# Patient Record
Sex: FEMALE | Race: WHITE | HISPANIC OR LATINO | Employment: UNEMPLOYED | URBAN - METROPOLITAN AREA
[De-identification: names, ages, dates, MRNs, and addresses within clinical notes are randomized per-mention and may not be internally consistent; named-entity substitution may affect disease eponyms.]

---
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
## 2022-01-12 VITALS — BODY MASS INDEX: 45.98 KG/M2 | WEIGHT: 276 LBS | HEIGHT: 65 IN

## 2024-09-24 ENCOUNTER — HOSPITAL ENCOUNTER (EMERGENCY)
Facility: CLINIC | Age: 24
Discharge: HOME | End: 2024-09-24
Attending: EMERGENCY MEDICINE | Admitting: EMERGENCY MEDICINE
Payer: COMMERCIAL

## 2024-09-24 ENCOUNTER — ANCILLARY PROCEDURE (OUTPATIENT)
Dept: ULTRASOUND IMAGING | Facility: CLINIC | Age: 24
End: 2024-09-24
Attending: EMERGENCY MEDICINE
Payer: COMMERCIAL

## 2024-09-24 VITALS
WEIGHT: 280 LBS | TEMPERATURE: 98.9 F | OXYGEN SATURATION: 99 % | BODY MASS INDEX: 46.65 KG/M2 | HEIGHT: 65 IN | DIASTOLIC BLOOD PRESSURE: 93 MMHG | RESPIRATION RATE: 22 BRPM | HEART RATE: 97 BPM | SYSTOLIC BLOOD PRESSURE: 141 MMHG

## 2024-09-24 DIAGNOSIS — L03.116 CELLULITIS OF LEFT LEG: ICD-10-CM

## 2024-09-24 PROCEDURE — 99284 EMERGENCY DEPT VISIT MOD MDM: CPT

## 2024-09-24 RX ORDER — CEPHALEXIN 500 MG/1
500 CAPSULE ORAL 3 TIMES DAILY
Qty: 30 CAPSULE | Refills: 0 | Status: SHIPPED | OUTPATIENT
Start: 2024-09-24 | End: 2024-10-04

## 2024-09-24 RX ORDER — SULFAMETHOXAZOLE/TRIMETHOPRIM 800-160 MG
1 TABLET ORAL 2 TIMES DAILY
Qty: 14 TABLET | Refills: 0 | Status: SHIPPED | OUTPATIENT
Start: 2024-09-24 | End: 2024-10-01

## 2024-09-24 ASSESSMENT — COLUMBIA-SUICIDE SEVERITY RATING SCALE - C-SSRS
2. HAVE YOU ACTUALLY HAD ANY THOUGHTS OF KILLING YOURSELF IN THE PAST MONTH?: NO
6. HAVE YOU EVER DONE ANYTHING, STARTED TO DO ANYTHING, OR PREPARED TO DO ANYTHING TO END YOUR LIFE?: NO
1. IN THE PAST MONTH, HAVE YOU WISHED YOU WERE DEAD OR WISHED YOU COULD GO TO SLEEP AND NOT WAKE UP?: NO

## 2024-09-25 NOTE — ED PROVIDER NOTES
EMERGENCY DEPARTMENT ENCOUNTER      NAME: Hailey Mathis  AGE: 24 year old female  YOB: 2000  MRN: [id number]  EVALUATION DATE & TIME: No admission date for patient encounter.    PCP: System, Provider Not In    ED PROVIDER: Lenin Natarajan M.D.      Chief Complaint   Patient presents with    Wound Check         FINAL IMPRESSION:  1. Cellulitis of left leg          ED COURSE & MEDICAL DECISION MAKING:    Pertinent Labs & Imaging studies reviewed. (See chart for details)  24 year old female presents to the Emergency Department for evaluation of flank pain.  Was scratched by a dog.  Now having worsening pain.  Had a pimple that popped initially there and now with worsening redness.  Does appear to be cellulitis.  Did do an ultrasound no drainable abscess noted.  Will put on Keflex and Bactrim.  Will have her follow-up with primary in 3 days.  Did put referral for this.  Afebrile here.  No tachycardia.  No signs of sepsis.  Will return for any worsening symptoms.  Discharged home    9:40 PM I met with the patient to gather history and to perform my initial exam. I discussed the plan for care while in the Emergency Department.   10:00 PM We discussed the plan for discharge and the patient is agreeable. Reviewed supportive cares, symptomatic treatment, outpatient follow up, and reasons to return to the Emergency Department. Patient to be discharged by ED RN.      At the conclusion of the encounter I discussed the results of all of the tests and the disposition. The questions were answered. The patient or family acknowledged understanding and was agreeable with the care plan.     Medical Decision Making  Obtained supplemental history:Supplemental history obtained?: Documented in chart  Reviewed external records: External records reviewed?: Documented in chart  Care impacted by chronic illness:N/A  Care significantly affected by social determinants of health:Access to Medical Care  Did you consider but not  order tests?: Work up considered but not performed and documented in chart, if applicable  Did you interpret images independently?: Independent interpretation of ECG and images noted in documentation, when applicable.  Consultation discussion with other provider:Did you involve another provider (consultant, , pharmacy, etc.)?: No  Discharge. I prescribed additional prescription strength medication(s) as charted. See documentation for any additional details.  Not Applicable      MEDICATIONS GIVEN IN THE EMERGENCY:  Medications - No data to display    NEW PRESCRIPTIONS STARTED AT TODAY'S ER VISIT  Discharge Medication List as of 9/24/2024  9:59 PM        START taking these medications    Details   cephALEXin (KEFLEX) 500 MG capsule Take 1 capsule (500 mg) by mouth 3 times daily for 10 days., Disp-30 capsule, R-0, Local Print      sulfamethoxazole-trimethoprim (BACTRIM DS) 800-160 MG tablet Take 1 tablet by mouth 2 times daily for 7 days., Disp-14 tablet, R-0, Local Print                =================================================================    HPI    Patient information was obtained from: Patient    Use of : N/A      Hailey Mathis is a 24 year old female with no pertinent medical history who presents to this ED for evaluation of a wound check.    The patient reports here with a dog scratch to her leg anterior thigh that happened last week.  She reports developing an abscess with surrounding redness.  She repots popping the area and had green discharge.  She notes coninuing redness and pain to the anterior left thigh where the dog scratched her.  She notes it was her friend's dog. She notes feeling chilled but no fevers.  No other complains at this time.  She denies any nausea or vomiting. She denies any medication allergies.No chronic medications.      PAST MEDICAL HISTORY:  No past medical history on file.    PAST SURGICAL HISTORY:  Past Surgical History:   Procedure Laterality Date    [place]  LAP,APPENDECTOMY N/A 6/25/2021    Procedure: APPENDECTOMY, LAPAROSCOPIC;  Surgeon: Paulo Hogan DO;  Location: [facility] Main OR;  Service: General           CURRENT MEDICATIONS:    No current facility-administered medications for this encounter.     Current Outpatient Medications   Medication Sig Dispense Refill    acetaminophen (TYLENOL) 500 MG tablet [ACETAMINOPHEN (TYLENOL) 500 MG TABLET] Take 1-2 tablets (500-1,000 mg total) by mouth every 4 (four) hours as needed.  0    cephALEXin (KEFLEX) 500 MG capsule Take 1 capsule (500 mg) by mouth 3 times daily for 10 days. 30 capsule 0    sulfamethoxazole-trimethoprim (BACTRIM DS) 800-160 MG tablet Take 1 tablet by mouth 2 times daily for 7 days. 14 tablet 0         ALLERGIES:  No Known Allergies    FAMILY HISTORY:  No family history on file.    SOCIAL HISTORY:   Social History     Socioeconomic History    Marital status: Single   Tobacco Use    Smoking status: Never    Smokeless tobacco: Current    Tobacco comments:     e cigarettes once a week   Substance and Sexual Activity    Alcohol use: No     Social Determinants of Health     Financial Resource Strain: Low Risk  (10/10/2023)    Received from [facility]    Financial Resource Strain     Difficulty of Paying Living Expenses: 3   Food Insecurity: No Food Insecurity (10/10/2023)    Received from [facility]    Food Insecurity     Worried About Running Out of Food in the Last Year: 1   Transportation Needs: No Transportation Needs (10/10/2023)    Received from [facility]    Transportation Needs     Lack of Transportation (Medical): 1   Social Connections: Socially Integrated (10/10/2023)    Received from [facility]    Social Connections     Frequency of Communication with Friends and Family: 0   Housing Stability: Low Risk  (10/10/2023)    Received from [facility]    Housing Stability     Unable to Pay for Housing in the Last Year: 1       VITALS:  BP (!) 141/93   Pulse 97   Temp 98.9  F (37.2  C) (Oral)   Resp 22   Ht 1.651 m (5' 5\")   Wt 127 kg (280 lb)   LMP 09/16/2024   SpO2 99%   BMI 46.59 kg/m      PHYSICAL EXAM    Physical Exam  Vitals and nursing note reviewed.   Constitutional:       General: She is not in acute distress.     Appearance: She is not diaphoretic.   HENT:      Head: Atraumatic.      Mouth/Throat:      Pharynx: No oropharyngeal exudate.   Eyes:      General: No scleral icterus.     Pupils: Pupils are equal, round, and reactive to light.   Cardiovascular:      Rate and Rhythm: Normal rate and regular rhythm.      Heart sounds: Normal heart sounds.   Pulmonary:      Effort: No respiratory distress.      Breath sounds: Normal breath sounds.   Abdominal:      Palpations: Abdomen is soft.      Tenderness: There is no abdominal tenderness. There is no guarding or rebound. Negative signs include Smith's sign.   Musculoskeletal:         General: Swelling and tenderness present.      Comments: March circular area of erythema over left thigh.  There is a central ulceration with some small drainage.  Some induration noted.   Skin:     General: Skin is warm.      Findings: No rash.   Neurological:      General: No focal deficit present.      Mental Status: She is alert.           LAB:  All pertinent labs reviewed and interpreted.  Labs Ordered and Resulted from Time of ED Arrival to Time of ED Departure - No data to display    RADIOLOGY:  Reviewed all pertinent imaging. Please see official radiology report.  POC US SOFT TISSUE    (Results Pending)       PROCEDURES:   PROCEDURE: Emergency Department Limited Bedside Screening Ultrasound   TYPE:    ED LIMITED BEDSIDE SCREENING US - SOFT TISSUE   INDICATIONS: Left leg cellulitis   PROCEDURE PROVIDER:   Lenin Natarajan    WINDOW AND FINDINGS: Left anterior thigh: Cobblestoning but no obvious " drainable abscess   IMAGES SAVED AND STORED FOR ARCHIVE AND REVIEW: Yes        I, Aman Zheng, am serving as a scribe to document services personally performed by Dr. Lenin Natarajan, based on my observation and the provider's statements to me. I, Lenin Natarajan MD attest that Aman Zheng is acting in a scribe capacity, has observed my performance of the services and has documented them in accordance with my direction.    Lenin Natarajan M.D.  Emergency Medicine  [facility] EMERGENCY ROOM  [address] [place] [id number]  [phone]  Dept: [phone]      Lenin Natarajan MD  09/24/24 9865

## 2024-09-25 NOTE — ED TRIAGE NOTES
Patient arrives to the ER with a wound from a dog scratch that happened last Monday. Patient started noticing the wound getting worse and becoming red and swollen and spreading. Denies fevers at home, but endorses chills.     Triage Assessment (Adult)       Row Name 09/24/24 212          Triage Assessment    Airway WDL WDL        Respiratory WDL    Respiratory WDL WDL        Skin Circulation/Temperature WDL    Skin Circulation/Temperature WDL X  wound to left thigh with redness and swelling        Cardiac WDL    Cardiac WDL WDL        Peripheral/Neurovascular WDL    Peripheral Neurovascular WDL WDL

## 2025-04-28 ENCOUNTER — HOSPITAL ENCOUNTER (EMERGENCY)
Facility: CLINIC | Age: 25
Discharge: HOME | End: 2025-04-28
Payer: COMMERCIAL

## 2025-04-28 ENCOUNTER — APPOINTMENT (OUTPATIENT)
Dept: ULTRASOUND IMAGING | Facility: CLINIC | Age: 25
End: 2025-04-28
Payer: COMMERCIAL

## 2025-04-28 VITALS
RESPIRATION RATE: 16 BRPM | HEART RATE: 61 BPM | OXYGEN SATURATION: 100 % | WEIGHT: 275 LBS | DIASTOLIC BLOOD PRESSURE: 67 MMHG | TEMPERATURE: 96.3 F | HEIGHT: 65 IN | SYSTOLIC BLOOD PRESSURE: 160 MMHG | BODY MASS INDEX: 45.82 KG/M2

## 2025-04-28 DIAGNOSIS — N92.0 MENORRHAGIA WITH REGULAR CYCLE: ICD-10-CM

## 2025-04-28 LAB
ALBUMIN SERPL BCG-MCNC: 4.4 G/DL (ref 3.5–5.2)
ALP SERPL-CCNC: 49 U/L (ref 40–150)
ALT SERPL W P-5'-P-CCNC: 45 U/L (ref 0–50)
ANION GAP SERPL CALCULATED.3IONS-SCNC: 9 MMOL/L (ref 7–15)
AST SERPL W P-5'-P-CCNC: 29 U/L (ref 0–45)
BASOPHILS # BLD AUTO: 0 10E3/UL (ref 0–0.2)
BASOPHILS NFR BLD AUTO: 0 %
BILIRUB DIRECT SERPL-MCNC: 0.26 MG/DL (ref 0–0.3)
BILIRUB SERPL-MCNC: 0.8 MG/DL
BUN SERPL-MCNC: 12.8 MG/DL (ref 6–20)
CALCIUM SERPL-MCNC: 9.4 MG/DL (ref 8.8–10.4)
CHLORIDE SERPL-SCNC: 105 MMOL/L (ref 98–107)
CREAT SERPL-MCNC: 0.62 MG/DL (ref 0.51–0.95)
EGFRCR SERPLBLD CKD-EPI 2021: >90 ML/MIN/1.73M2
EOSINOPHIL # BLD AUTO: 0.1 10E3/UL (ref 0–0.7)
EOSINOPHIL NFR BLD AUTO: 2 %
ERYTHROCYTE [DISTWIDTH] IN BLOOD BY AUTOMATED COUNT: 11.9 % (ref 10–15)
GLUCOSE SERPL-MCNC: 106 MG/DL (ref 70–99)
HCG SERPL QL: NEGATIVE
HCO3 SERPL-SCNC: 26 MMOL/L (ref 22–29)
HCT VFR BLD AUTO: 41.4 % (ref 35–47)
HGB BLD-MCNC: 14.3 G/DL (ref 11.7–15.7)
IMM GRANULOCYTES # BLD: 0 10E3/UL
IMM GRANULOCYTES NFR BLD: 0 %
LIPASE SERPL-CCNC: 49 U/L (ref 13–60)
LYMPHOCYTES # BLD AUTO: 1.7 10E3/UL (ref 0.8–5.3)
LYMPHOCYTES NFR BLD AUTO: 37 %
MAGNESIUM SERPL-MCNC: 2.1 MG/DL (ref 1.7–2.3)
MCH RBC QN AUTO: 30.6 PG (ref 26.5–33)
MCHC RBC AUTO-ENTMCNC: 34.5 G/DL (ref 31.5–36.5)
MCV RBC AUTO: 89 FL (ref 78–100)
MONOCYTES # BLD AUTO: 0.3 10E3/UL (ref 0–1.3)
MONOCYTES NFR BLD AUTO: 7 %
NEUTROPHILS # BLD AUTO: 2.5 10E3/UL (ref 1.6–8.3)
NEUTROPHILS NFR BLD AUTO: 53 %
NRBC # BLD AUTO: 0 10E3/UL
NRBC BLD AUTO-RTO: 0 /100
PLATELET # BLD AUTO: 247 10E3/UL (ref 150–450)
POTASSIUM SERPL-SCNC: 3.9 MMOL/L (ref 3.4–5.3)
PROT SERPL-MCNC: 7.1 G/DL (ref 6.4–8.3)
RBC # BLD AUTO: 4.68 10E6/UL (ref 3.8–5.2)
SODIUM SERPL-SCNC: 140 MMOL/L (ref 135–145)
WBC # BLD AUTO: 4.7 10E3/UL (ref 4–11)

## 2025-04-28 PROCEDURE — 80048 BASIC METABOLIC PNL TOTAL CA: CPT

## 2025-04-28 PROCEDURE — 85004 AUTOMATED DIFF WBC COUNT: CPT

## 2025-04-28 PROCEDURE — 36415 COLL VENOUS BLD VENIPUNCTURE: CPT

## 2025-04-28 PROCEDURE — 84703 CHORIONIC GONADOTROPIN ASSAY: CPT

## 2025-04-28 PROCEDURE — 250N000013 HC RX MED GY IP 250 OP 250 PS 637

## 2025-04-28 PROCEDURE — 96360 HYDRATION IV INFUSION INIT: CPT

## 2025-04-28 PROCEDURE — 83690 ASSAY OF LIPASE: CPT

## 2025-04-28 PROCEDURE — 99284 EMERGENCY DEPT VISIT MOD MDM: CPT | Mod: 25

## 2025-04-28 PROCEDURE — 96361 HYDRATE IV INFUSION ADD-ON: CPT

## 2025-04-28 PROCEDURE — 84155 ASSAY OF PROTEIN SERUM: CPT

## 2025-04-28 PROCEDURE — 258N000003 HC RX IP 258 OP 636

## 2025-04-28 PROCEDURE — 76830 TRANSVAGINAL US NON-OB: CPT

## 2025-04-28 PROCEDURE — 83735 ASSAY OF MAGNESIUM: CPT

## 2025-04-28 RX ORDER — ACETAMINOPHEN 325 MG/1
975 TABLET ORAL ONCE
Status: COMPLETED | OUTPATIENT
Start: 2025-04-28 | End: 2025-04-28

## 2025-04-28 RX ORDER — OXYCODONE HYDROCHLORIDE 5 MG/1
5 TABLET ORAL ONCE
Status: COMPLETED | OUTPATIENT
Start: 2025-04-28 | End: 2025-04-28

## 2025-04-28 RX ADMIN — ACETAMINOPHEN 975 MG: 325 TABLET, FILM COATED ORAL at 11:59

## 2025-04-28 RX ADMIN — SODIUM CHLORIDE 1000 ML: 0.9 INJECTION, SOLUTION INTRAVENOUS at 11:59

## 2025-04-28 RX ADMIN — OXYCODONE 5 MG: 5 TABLET ORAL at 13:59

## 2025-04-28 ASSESSMENT — COLUMBIA-SUICIDE SEVERITY RATING SCALE - C-SSRS
1. IN THE PAST MONTH, HAVE YOU WISHED YOU WERE DEAD OR WISHED YOU COULD GO TO SLEEP AND NOT WAKE UP?: NO
2. HAVE YOU ACTUALLY HAD ANY THOUGHTS OF KILLING YOURSELF IN THE PAST MONTH?: NO
6. HAVE YOU EVER DONE ANYTHING, STARTED TO DO ANYTHING, OR PREPARED TO DO ANYTHING TO END YOUR LIFE?: NO

## 2025-04-28 ASSESSMENT — ACTIVITIES OF DAILY LIVING (ADL): ADLS_ACUITY_SCORE: 41

## 2025-04-28 NOTE — ED TRIAGE NOTES
Patient states she has soaked 20 pads over the last couple days, bright red blood, dizziness. Has her period, history of PCOS.      Triage Assessment (Adult)       Row Name 04/28/25 0959          Triage Assessment    Airway WDL WDL        Respiratory WDL    Respiratory WDL WDL        Skin Circulation/Temperature WDL    Skin Circulation/Temperature WDL WDL        Cardiac WDL    Cardiac WDL WDL        Peripheral/Neurovascular WDL    Peripheral Neurovascular WDL WDL        Cognitive/Neuro/Behavioral WDL    Cognitive/Neuro/Behavioral WDL WDL

## 2025-04-28 NOTE — ED PROVIDER NOTES
Emergency Department Encounter   NAME: Hailey Mathis  AGE: 25 year old female   YOB: 2000 ;   MRN: [id number] ;    ED PROVIDER: Laurel Carcamo PA-C    PCP: Silviano Olvera    Evaluation Date & Time:   4/28/25 0954    CHIEF COMPLAINT:  Vaginal Bleeding      FINAL IMPRESSION:    ICD-10-CM    1. Menorrhagia with regular cycle  N92.0 Ob/Gyn  Referral            IMPRESSION AND PLAN   MDM: Hailey Mathis is a 25 year old female with a pertinent history of hypothyroidism, PCOS, vitamin D deficiency who presents to the ED by walk-in for evaluation of heavy vaginal bleeding.  Patient reports she started her menstrual cycle 3 days ago and was bleeding through 1 pad every hour.  She states her periods are typically not this heavy and she is now experiencing lightheadedness which prompted her visit to the ED today.    Vitals - hypertensive otherwise vitally stable. On exam patient is well-appearing no acute distress.  Abdomen is soft with mild tenderness suprapubically.  Patient is unsure of pregnancy status, will certainly test for this as this is in the differential as well as miscarriage, ectopic pregnancy.  Will also monitor hemoglobin to ensure no blood transfusion is necessary today as well as pelvic ultrasound to evaluate for any ovarian versus uterine abnormalities.     Patient started on Tylenol and IV fluids for initial symptom management.  CBC without leukocytosis, hemoglobin stable at 14.3.  Remainder of blood work reviewed and overall unremarkable.  Pregnancy test negative.  Pelvic ultrasound negative for any acute abnormalities.  I discussed reassuring workup with patient.  She states that in the time she has been in the emergency department she has soaked through 2 pads which is significantly improved from the beginning of her menstrual cycle.  I called OB to ensure that they felt comfortable with discharge with close outpatient follow-up.  They do not feel that patient  needs to be started on any progesterone or TXA given stable vital signs and hemoglobin.  Patient was provided a referral to OB/GYN for further outpatient evaluation and management.  In the meantime, I recommended Tylenol and heating packs for any pelvic cramping.  All questions and concerns addressed. Patient is overall agreeable to this plan and feels comfortable with discharge at this time.      MIPS (CTPE, Dental pain, [place], Sinusitis, Asthma/COPD, Head Trauma): Not Applicable    SEPSIS: None      ED COURSE:  11:32 AM I met and introduced myself to the patient. I gathered initial history and performed my physical exam. We discussed plan for initial workup.   2:24 PM I spoke with JOSIAS Ross.  2:28 PM I rechecked the patient and discussed results, discharge, follow up, and reasons to return to the ED.           MEDICATIONS GIVEN IN THE EMERGENCY DEPARTMENT:  Medications   sodium chloride 0.9% BOLUS 1,000 mL (0 mLs Intravenous Stopped 4/28/25 1341)   acetaminophen (TYLENOL) tablet 975 mg (975 mg Oral $Given 4/28/25 1159)   oxyCODONE (ROXICODONE) tablet 5 mg (5 mg Oral $Given 4/28/25 1359)         NEW PRESCRIPTIONS STARTED AT TODAY'S ED VISIT:  Discharge Medication List as of 4/28/2025  2:32 PM            BRIEF HPI   Patient information was obtained from: Patient and Boyfriend   Use of Intrepreter: N/A     Hailey Mathis is a 25 year old female who presents to the ED by walk-in for evaluation of vaginal bleeding.  Patient reports her menstrual cycle started 4 days ago.  She states her periods are typically regular and this is the normal time that she would experience her next menstrual cycle.  She states that on the first day, she went through 24 extra-large pads in 24 hours.  The bleeding persisted over the next 2 days and she lost count of the amount of pads that she was soaking through.  Patient states this is very abnormal for her menstrual cycles as her periods are typically not this heavy.  She also  "notes of pelvic cramping.  However, this morning she feels that her bleeding has lightened but now is concerned about lightheadedness and dizziness.  She states the blood is bright red and she is passing smaller clots.  She does have a history of PCOS but is not taking any sort of birth control.  She has never been pregnant but is unsure if she is pregnant today.       REVIEW OF SYSTEMS:  Pertinent positive and negative symptoms per HPI.       MEDICAL HISTORY     No past medical history on file.    Past Surgical History:   Procedure Laterality Date    [place] LAP,APPENDECTOMY N/A 6/25/2021    Procedure: APPENDECTOMY, LAPAROSCOPIC;  Surgeon: Paulo Hogan DO;  Location: [facility];  Service: General       No family history on file.    Social History     Tobacco Use    Smoking status: Never    Smokeless tobacco: Current    Tobacco comments:     e cigarettes once a week   Substance Use Topics    Alcohol use: No         PHYSICAL EXAM     First Vitals:  Patient Vitals for the past 24 hrs:   BP Temp Temp src Pulse Resp SpO2 Height Weight   04/28/25 1348 (!) 160/67 -- -- 61 -- 100 % -- --   04/28/25 1159 (!) 144/81 -- -- 65 16 97 % -- --   04/28/25 1001 (!) 173/96 (!) 96.3  F (35.7  C) Temporal 73 18 97 % 1.651 m (5' 5\") 124.7 kg (275 lb)       PHYSICAL EXAM:  Physical Exam  Vitals and nursing note reviewed.   Constitutional:       General: She is not in acute distress.     Appearance: Normal appearance. She is obese. She is not ill-appearing or diaphoretic.   HENT:      Head: Normocephalic and atraumatic.      Mouth/Throat:      Mouth: Mucous membranes are moist.   Eyes:      Conjunctiva/sclera: Conjunctivae normal.   Cardiovascular:      Rate and Rhythm: Normal rate.   Pulmonary:      Effort: Pulmonary effort is normal.   Abdominal:      General: Abdomen is flat. There is no distension.      Palpations: Abdomen is soft.      Tenderness: There is abdominal tenderness in the right lower quadrant, suprapubic area and " left lower quadrant. There is no guarding.   Musculoskeletal:      Cervical back: Neck supple.   Skin:     General: Skin is warm and dry.      Coloration: Skin is not pale.   Neurological:      General: No focal deficit present.      Mental Status: She is alert and oriented to person, place, and time.   Psychiatric:         Mood and Affect: Mood normal.          RESULTS     LAB:  All pertinent labs reviewed and interpreted  Labs Ordered and Resulted from Time of ED Arrival to Time of ED Departure   BASIC METABOLIC PANEL - Abnormal       Result Value    Sodium 140      Potassium 3.9      Chloride 105      Carbon Dioxide (CO2) 26      Anion Gap 9      Urea Nitrogen 12.8      Creatinine 0.62      GFR Estimate >90      Calcium 9.4      Glucose 106 (*)    HEPATIC FUNCTION PANEL - Normal    Protein Total 7.1      Albumin 4.4      Bilirubin Total 0.8      Alkaline Phosphatase 49      AST 29      ALT 45      Bilirubin Direct 0.26     LIPASE - Normal    Lipase 49     MAGNESIUM - Normal    Magnesium 2.1     HCG QUALITATIVE PREGNANCY - Normal    hCG Serum Qualitative Negative     CBC WITH PLATELETS AND DIFFERENTIAL    WBC Count 4.7      RBC Count 4.68      Hemoglobin 14.3      Hematocrit 41.4      MCV 89      MCH 30.6      MCHC 34.5      RDW 11.9      Platelet Count 247      % Neutrophils 53      % Lymphocytes 37      % Monocytes 7      % Eosinophils 2      % Basophils 0      % Immature Granulocytes 0      NRBCs per 100 WBC 0      Absolute Neutrophils 2.5      Absolute Lymphocytes 1.7      Absolute Monocytes 0.3      Absolute Eosinophils 0.1      Absolute Basophils 0.0      Absolute Immature Granulocytes 0.0      Absolute NRBCs 0.0         RADIOLOGY:  US Pelvis Cmplt w Transvag & Doppler LmtPel Duplex Limited   Final Result   IMPRESSION:     Normal pelvic ultrasound.                       Laurel Carcamo PA-C  Emergency Medicine   [facility] EMERGENCY ROOM       Laurel Carcamo PA-C  04/28/25  [id number]

## 2025-04-28 NOTE — DISCHARGE INSTRUCTIONS
Thankfully, your workup in the ED was negative for any emergent concerns. OBGYN would like to see you in clinic to ensure that you don't experience heavy bleeding during your period next month. In the meantime, take tylenol and use a heating pad for your cramping. Please return to the ED for any new or worsening symptoms.    It was a pleasure to care for you at [facility] today!    Laurel Carcamo PA-C